# Patient Record
Sex: FEMALE | Race: WHITE | NOT HISPANIC OR LATINO | Employment: UNEMPLOYED | URBAN - NONMETROPOLITAN AREA
[De-identification: names, ages, dates, MRNs, and addresses within clinical notes are randomized per-mention and may not be internally consistent; named-entity substitution may affect disease eponyms.]

---

## 2023-02-02 ENCOUNTER — HOSPITAL ENCOUNTER (EMERGENCY)
Facility: HOSPITAL | Age: 33
Discharge: HOME | End: 2023-02-02
Attending: EMERGENCY MEDICINE

## 2023-02-02 ENCOUNTER — APPOINTMENT (EMERGENCY)
Dept: RADIOLOGY | Facility: HOSPITAL | Age: 33
End: 2023-02-02

## 2023-02-02 VITALS
HEART RATE: 104 BPM | WEIGHT: 163.36 LBS | OXYGEN SATURATION: 99 % | RESPIRATION RATE: 20 BRPM | DIASTOLIC BLOOD PRESSURE: 87 MMHG | SYSTOLIC BLOOD PRESSURE: 125 MMHG

## 2023-02-02 DIAGNOSIS — J18.9 PNEUMONITIS: ICD-10-CM

## 2023-02-02 DIAGNOSIS — Z77.098 EXPOSURE TO CHEMICAL INHALATION: Primary | ICD-10-CM

## 2023-02-02 RX ORDER — LEVOTHYROXINE SODIUM 0.05 MG/1
50 TABLET ORAL DAILY
COMMUNITY

## 2023-02-02 RX ORDER — SERTRALINE HYDROCHLORIDE 100 MG/1
100 TABLET, FILM COATED ORAL DAILY
COMMUNITY

## 2023-02-02 RX ORDER — ALBUTEROL SULFATE 90 UG/1
2 AEROSOL, METERED RESPIRATORY (INHALATION) ONCE
Status: COMPLETED | OUTPATIENT
Start: 2023-02-02 | End: 2023-02-02

## 2023-02-02 RX ORDER — ALBUTEROL SULFATE 2.5 MG/3ML
2.5 SOLUTION RESPIRATORY (INHALATION) ONCE
Status: COMPLETED | OUTPATIENT
Start: 2023-02-02 | End: 2023-02-02

## 2023-02-02 RX ORDER — LORAZEPAM 0.5 MG/1
TABLET ORAL EVERY 8 HOURS PRN
COMMUNITY

## 2023-02-02 RX ORDER — TOPIRAMATE 25 MG/1
25 TABLET ORAL DAILY
COMMUNITY

## 2023-02-02 RX ORDER — DEXAMETHASONE 4 MG/1
8 TABLET ORAL ONCE
Status: COMPLETED | OUTPATIENT
Start: 2023-02-02 | End: 2023-02-02

## 2023-02-02 RX ORDER — LIDOCAINE HYDROCHLORIDE 20 MG/ML
5 INJECTION, SOLUTION EPIDURAL; INFILTRATION; INTRACAUDAL; PERINEURAL ONCE
Status: COMPLETED | OUTPATIENT
Start: 2023-02-02 | End: 2023-02-02

## 2023-02-02 RX ADMIN — ALBUTEROL SULFATE 2 PUFF: 90 AEROSOL, METERED RESPIRATORY (INHALATION) at 21:52

## 2023-02-02 RX ADMIN — DEXAMETHASONE 8 MG: 4 TABLET ORAL at 21:51

## 2023-02-02 RX ADMIN — LIDOCAINE HYDROCHLORIDE 5 ML: 20 INJECTION, SOLUTION EPIDURAL; INFILTRATION; INTRACAUDAL at 21:02

## 2023-02-02 RX ADMIN — ALBUTEROL SULFATE 2.5 MG: 2.5 SOLUTION RESPIRATORY (INHALATION) at 20:49

## 2023-02-03 NOTE — ED PROVIDER NOTES
History  Chief Complaint   Patient presents with   • Medical Problem     Bleach spill, Inhaled large amount bleach  After spill took shower, c/o pain with inspiration  Occurred yesterday  50-year-old female presents for evaluation of chest pressure  Patient states she had a bleach exposure yesterday which she believes is the source of her symptoms  She notes that she was attempting to put the bleach bottle on a table however the cath was on loose and she did not know, when she moved the bottle it hit a chair causing bleach to spray on her upper body  Patient states she got in her eyes however washed her eyes out in the shower, she denies pain or change in vision  Patient believes she inhaled bleach and feels burning sensation in her sinuses  Patient also reports a chest pressure sensation that started after this  She reports it is her whole left-sided chest   She has a sensation of dyspnea, she does endorse coughing episodes yesterday  She did have nausea yesterday however today this is remitted and she is tolerating p o  She denies cardiac history in herself, denies hormone use or previous VTE, lower extremity swelling or pain  Prior to Admission Medications   Prescriptions Last Dose Informant Patient Reported? Taking? LORazepam (ATIVAN) 0 5 mg tablet Past Week  Yes Yes   Sig: Take by mouth every 8 (eight) hours as needed for anxiety   levothyroxine 50 mcg tablet 2/1/2023  Yes Yes   Sig: Take 50 mcg by mouth daily   sertraline (ZOLOFT) 100 mg tablet 2/1/2023  Yes Yes   Sig: Take 100 mg by mouth daily   topiramate (TOPAMAX) 25 mg tablet 2/1/2023  Yes Yes   Sig: Take 25 mg by mouth daily      Facility-Administered Medications: None       Past Medical History:   Diagnosis Date   • Anxiety    • Disease of thyroid gland    • Endometriosis    • Migraine        No past surgical history on file  No family history on file    I have reviewed and agree with the history as documented  E-Cigarette/Vaping     E-Cigarette/Vaping Substances          Review of Systems   HENT: Positive for sinus pain  Eyes: Negative for pain and visual disturbance  Respiratory: Positive for chest tightness and shortness of breath  Cardiovascular: Negative for chest pain and leg swelling  Gastrointestinal: Positive for vomiting  Negative for nausea  All other systems reviewed and are negative  Physical Exam  Physical Exam  Vitals reviewed  Constitutional:       General: She is not in acute distress  Appearance: Normal appearance  She is not ill-appearing, toxic-appearing or diaphoretic  HENT:      Head: Normocephalic and atraumatic  Right Ear: External ear normal       Left Ear: External ear normal    Eyes:      General:         Right eye: No discharge  Left eye: No discharge  Extraocular Movements: Extraocular movements intact  Cardiovascular:      Rate and Rhythm: Normal rate and regular rhythm  Pulmonary:      Effort: Pulmonary effort is normal  No respiratory distress  Breath sounds: Normal breath sounds  No stridor  No wheezing, rhonchi or rales  Musculoskeletal:         General: No deformity or signs of injury  Right lower leg: No edema  Left lower leg: No edema  Skin:     General: Skin is warm  Coloration: Skin is not jaundiced or pale  Neurological:      General: No focal deficit present  Mental Status: She is alert  Mental status is at baseline        Gait: Gait normal          Vital Signs  ED Triage Vitals [02/02/23 2011]   Temp Pulse Respirations Blood Pressure SpO2   -- 104 20 125/87 100 %      Temp src Heart Rate Source Patient Position - Orthostatic VS BP Location FiO2 (%)   -- Monitor Sitting Right arm --      Pain Score       7           Vitals:    02/02/23 2011   BP: 125/87   Pulse: 104   Patient Position - Orthostatic VS: Sitting         Visual Acuity      ED Medications  Medications   dexamethasone (DECADRON) tablet 8 mg (has no administration in time range)   albuterol (PROVENTIL HFA,VENTOLIN HFA) inhaler 2 puff (has no administration in time range)   albuterol inhalation solution 2 5 mg (2 5 mg Nebulization Given 2/2/23 2049)   lidocaine (PF) (XYLOCAINE-MPF) 2 % injection 5 mL (5 mL Inhalation Given 2/2/23 2102)       Diagnostic Studies  Results Reviewed     None                 XR chest 1 view portable    (Results Pending)              Procedures  Procedures         ED Course  ED Course as of 02/02/23 2142   Thu Feb 02, 2023 2015 Blood Pressure: 125/87   2015 Pulse: 104   2015 Respirations: 20   2015 SpO2: 100 %   2054 Procedure Note: EKG  Date/Time: 02/02/23 8:54 PM   Interpreted by: Asia Owens  Indications / Diagnosis: CP  ECG reviewed by me, the ED Provider: yes   The EKG demonstrates:  Rhythm: normal sinus  Intervals: normal intervals  Axis: normal axis  QRS/Blocks: normal QRS  ST Changes: No acute ST Changes, no STD/JENNY        2134 XR chest 1 view portable  No ptx, no acute cardiopulmonary disease   2139 Feels improved after treatments, states lidocaine was weird feeling during but achieved pain control  Will give dose of decadron for pneumonitis as well as albuterol with spacer  Medical Decision Making  71-year-old female presents for evaluation after bleach exposure yesterday  Patient notes she cut bleach in her eyes for which she is presently asymptomatic, she reports continued sinus pain as well as a chest pressure sensation  She does have a sensation of dyspnea however is saturating well on room air at 100%, she is in no respiratory distress  Will attempt to treat symptoms of mild pneumonitis with albuterol as well as nebulized lidocaine  Will obtain a chest x-ray to rule out pneumothorax with coughing events, pneumonitis  Will obtain EKG with chest pressure sensation    Patient is low risk for ACS as well as PE, do not suspect these his etiologies  Exposure to chemical inhalation: acute illness or injury  Pneumonitis: acute illness or injury  Amount and/or Complexity of Data Reviewed  Radiology: ordered  Decision-making details documented in ED Course  ECG/medicine tests: ordered  Decision-making details documented in ED Course  Risk  Prescription drug management  Disposition  Final diagnoses:   Exposure to chemical inhalation   Pneumonitis     Time reflects when diagnosis was documented in both MDM as applicable and the Disposition within this note     Time User Action Codes Description Comment    2/2/2023  9:35 PM Shahnaz Cline Add [Z77 098] Exposure to chemical inhalation     2/2/2023  9:36 PM Shahnaz Cline Add [J18 9] Pneumonitis       ED Disposition     ED Disposition   Discharge    Condition   Stable    Date/Time   Thu Feb 2, 2023  9:35 PM    Comment   Alan Valle discharge to home/self care  Follow-up Information     Follow up With Specialties Details Why Contact Info    Caroline Ochoa MD Family Medicine   [address] [place] [id number] [phone]            Patient's Medications   Discharge Prescriptions    No medications on file       No discharge procedures on file      PDMP Review     None          ED Provider  Electronically Signed by           Ad May DO  02/02/23 7088

## 2023-02-04 LAB
ATRIAL RATE: 74 BPM
P AXIS: 31 DEGREES
PR INTERVAL: 122 MS
QRS AXIS: 58 DEGREES
QRSD INTERVAL: 84 MS
QT INTERVAL: 380 MS
QTC INTERVAL: 421 MS
T WAVE AXIS: 28 DEGREES
VENTRICULAR RATE: 74 BPM

## 2023-12-22 ENCOUNTER — HOSPITAL ENCOUNTER (EMERGENCY)
Facility: HOSPITAL | Age: 33
Discharge: HOME | End: 2023-12-23
Attending: EMERGENCY MEDICINE | Admitting: EMERGENCY MEDICINE
Payer: COMMERCIAL

## 2023-12-22 DIAGNOSIS — O20.0 THREATENED MISCARRIAGE IN EARLY PREGNANCY: Primary | ICD-10-CM

## 2023-12-22 LAB
BASOPHILS # BLD AUTO: 0.06 THOUSANDS/ÂΜL (ref 0–0.1)
BASOPHILS NFR BLD AUTO: 1 % (ref 0–1)
EOSINOPHIL # BLD AUTO: 0.56 THOUSAND/ÂΜL (ref 0–0.61)
EOSINOPHIL NFR BLD AUTO: 6 % (ref 0–6)
ERYTHROCYTE [DISTWIDTH] IN BLOOD BY AUTOMATED COUNT: 16.6 % (ref 11.6–15.1)
HCT VFR BLD AUTO: 38.3 % (ref 34.8–46.1)
HGB BLD-MCNC: 11.7 G/DL (ref 11.5–15.4)
IMM GRANULOCYTES # BLD AUTO: 0.03 THOUSAND/UL (ref 0–0.2)
IMM GRANULOCYTES NFR BLD AUTO: 0 % (ref 0–2)
LYMPHOCYTES # BLD AUTO: 1.88 THOUSANDS/ÂΜL (ref 0.6–4.47)
LYMPHOCYTES NFR BLD AUTO: 21 % (ref 14–44)
MCH RBC QN AUTO: 24 PG (ref 26.8–34.3)
MCHC RBC AUTO-ENTMCNC: 30.5 G/DL (ref 31.4–37.4)
MCV RBC AUTO: 79 FL (ref 82–98)
MONOCYTES # BLD AUTO: 0.65 THOUSAND/ÂΜL (ref 0.17–1.22)
MONOCYTES NFR BLD AUTO: 7 % (ref 4–12)
NEUTROPHILS # BLD AUTO: 5.63 THOUSANDS/ÂΜL (ref 1.85–7.62)
NEUTS SEG NFR BLD AUTO: 65 % (ref 43–75)
NRBC BLD AUTO-RTO: 0 /100 WBCS
PLATELET # BLD AUTO: 284 THOUSANDS/UL (ref 149–390)
PMV BLD AUTO: 11.6 FL (ref 8.9–12.7)
RBC # BLD AUTO: 4.88 MILLION/UL (ref 3.81–5.12)
WBC # BLD AUTO: 8.81 THOUSAND/UL (ref 4.31–10.16)

## 2023-12-22 PROCEDURE — 80048 BASIC METABOLIC PNL TOTAL CA: CPT | Performed by: EMERGENCY MEDICINE

## 2023-12-22 PROCEDURE — 99284 EMERGENCY DEPT VISIT MOD MDM: CPT | Performed by: EMERGENCY MEDICINE

## 2023-12-22 PROCEDURE — 99283 EMERGENCY DEPT VISIT LOW MDM: CPT

## 2023-12-22 PROCEDURE — 85025 COMPLETE CBC W/AUTO DIFF WBC: CPT | Performed by: EMERGENCY MEDICINE

## 2023-12-22 PROCEDURE — 76815 OB US LIMITED FETUS(S): CPT | Performed by: EMERGENCY MEDICINE

## 2023-12-22 PROCEDURE — 36415 COLL VENOUS BLD VENIPUNCTURE: CPT | Performed by: EMERGENCY MEDICINE

## 2023-12-22 PROCEDURE — 96360 HYDRATION IV INFUSION INIT: CPT

## 2023-12-22 PROCEDURE — 0241U HB NFCT DS VIR RESP RNA 4 TRGT: CPT | Performed by: EMERGENCY MEDICINE

## 2023-12-22 RX ORDER — ACETAMINOPHEN 325 MG/1
975 TABLET ORAL ONCE
Status: COMPLETED | OUTPATIENT
Start: 2023-12-22 | End: 2023-12-22

## 2023-12-22 RX ADMIN — ACETAMINOPHEN 975 MG: 325 TABLET, FILM COATED ORAL at 23:35

## 2023-12-22 RX ADMIN — SODIUM CHLORIDE 1000 ML: 0.9 INJECTION, SOLUTION INTRAVENOUS at 23:43

## 2023-12-23 VITALS
TEMPERATURE: 97.2 F | HEART RATE: 77 BPM | DIASTOLIC BLOOD PRESSURE: 66 MMHG | RESPIRATION RATE: 20 BRPM | OXYGEN SATURATION: 99 % | SYSTOLIC BLOOD PRESSURE: 107 MMHG

## 2023-12-23 LAB
ANION GAP SERPL CALCULATED.3IONS-SCNC: 11 MMOL/L
BACTERIA UR QL AUTO: NORMAL /HPF
BILIRUB UR QL STRIP: NEGATIVE
BUN SERPL-MCNC: 7 MG/DL (ref 5–25)
CALCIUM SERPL-MCNC: 9.1 MG/DL (ref 8.4–10.2)
CHLORIDE SERPL-SCNC: 101 MMOL/L (ref 96–108)
CLARITY UR: CLEAR
CO2 SERPL-SCNC: 22 MMOL/L (ref 21–32)
COLOR UR: YELLOW
CREAT SERPL-MCNC: 0.51 MG/DL (ref 0.6–1.3)
FLUAV RNA RESP QL NAA+PROBE: NEGATIVE
FLUBV RNA RESP QL NAA+PROBE: NEGATIVE
GFR SERPL CREATININE-BSD FRML MDRD: 127 ML/MIN/1.73SQ M
GLUCOSE SERPL-MCNC: 79 MG/DL (ref 65–140)
GLUCOSE UR STRIP-MCNC: NEGATIVE MG/DL
HGB UR QL STRIP.AUTO: ABNORMAL
KETONES UR STRIP-MCNC: ABNORMAL MG/DL
LEUKOCYTE ESTERASE UR QL STRIP: ABNORMAL
NITRITE UR QL STRIP: NEGATIVE
NON-SQ EPI CELLS URNS QL MICRO: NORMAL /HPF
PH UR STRIP.AUTO: 6 [PH]
POTASSIUM SERPL-SCNC: 3.7 MMOL/L (ref 3.5–5.3)
PROT UR STRIP-MCNC: NEGATIVE MG/DL
RBC #/AREA URNS AUTO: NORMAL /HPF
RSV RNA RESP QL NAA+PROBE: NEGATIVE
SARS-COV-2 RNA RESP QL NAA+PROBE: NEGATIVE
SODIUM SERPL-SCNC: 134 MMOL/L (ref 135–147)
SP GR UR STRIP.AUTO: <=1.005 (ref 1–1.03)
UROBILINOGEN UR QL STRIP.AUTO: 0.2 E.U./DL
WBC #/AREA URNS AUTO: NORMAL /HPF

## 2023-12-23 PROCEDURE — 87086 URINE CULTURE/COLONY COUNT: CPT | Performed by: EMERGENCY MEDICINE

## 2023-12-23 PROCEDURE — 81001 URINALYSIS AUTO W/SCOPE: CPT | Performed by: EMERGENCY MEDICINE

## 2023-12-23 NOTE — DISCHARGE INSTRUCTIONS
You have been seen for vaginal spotting and abdominal pain during pregnancy. Please take tylenol as needed for discomfort. Return to the emergency department if you develop worsening bleeding, abdominal pain, vomiting, fevers or any other symptoms of concern. Please follow up with your OB/GYN by calling the number provided.

## 2023-12-23 NOTE — ED PROVIDER NOTES
"History  Chief Complaint   Patient presents with    Vaginal Bleeding     Per pt, is 9 weeks pregnant, following the [facility] OB, no issues thus far, began w/ vomiting/chills 24 hours ago, then began w/ bloody tinged vaginal discharge and cramping.     Carolina Daomn is a 33 y.o. year old female @ 8w1d per prior US presenting to the [facility] ED for vaginal bleeding. Patient had several episodes of vomiting throughout the evening last night which resolved this afternoon. No associated diarrhea. Patient felt fatigued and body aches. No associated fevers. Patient feeling lower abdominal cramping starting this evening. Worsened one hour ago with left-sided, lower abdominal discomfort described as a 4/10 \"pinching\" sensation. Patient noticed red-tinged/brown discharge on toilet paper this evening as well. She is not passing clots of blood. No reported dysuria. Patient has not taken/received any medications at home for relief of symptoms.       History provided by:  Patient and medical records   used: No        Prior to Admission Medications   Prescriptions Last Dose Informant Patient Reported? Taking?   LORazepam (ATIVAN) 0.5 mg tablet Not Taking  Yes No   Sig: Take by mouth every 8 (eight) hours as needed for anxiety   Patient not taking: Reported on 12/22/2023   Prenatal MV-Min-Fe Fum-FA-DHA (PRENATAL 1 PO)   Yes No   Sig: Take by mouth   levothyroxine 50 mcg tablet   Yes No   Sig: Take 50 mcg by mouth daily   sertraline (ZOLOFT) 100 mg tablet   Yes No   Sig: Take 150 mg by mouth daily   topiramate (TOPAMAX) 25 mg tablet Not Taking  Yes No   Sig: Take 25 mg by mouth daily   Patient not taking: Reported on 12/22/2023      Facility-Administered Medications: None       Past Medical History:   Diagnosis Date    Anxiety     Disease of thyroid gland     Endometriosis     Migraine        History reviewed. No pertinent surgical history.    History reviewed. No pertinent family history.  I have reviewed and agree " with the history as documented.    E-Cigarette/Vaping     E-Cigarette/Vaping Substances     Social History     Tobacco Use    Smoking status: Never    Smokeless tobacco: Never   Substance Use Topics    Alcohol use: Not Currently    Drug use: Not Currently       Review of Systems   Constitutional:  Negative for chills and fever.   Respiratory:  Negative for shortness of breath.    Cardiovascular:  Negative for chest pain.   Gastrointestinal:  Positive for abdominal pain, nausea and vomiting. Negative for diarrhea.   Genitourinary:  Positive for vaginal bleeding. Negative for dysuria, flank pain and vaginal discharge.   Musculoskeletal:  Negative for back pain.   All other systems reviewed and are negative.      Physical Exam  Physical Exam  Vitals and nursing note reviewed.   Constitutional:       General: She is not in acute distress.     Appearance: Normal appearance. She is well-developed. She is not ill-appearing, toxic-appearing or diaphoretic.   HENT:      Head: Normocephalic and atraumatic.      Nose: No congestion or rhinorrhea.   Eyes:      General:         Right eye: No discharge.         Left eye: No discharge.   Cardiovascular:      Rate and Rhythm: Normal rate and regular rhythm.   Pulmonary:      Effort: Pulmonary effort is normal. No accessory muscle usage or respiratory distress.      Breath sounds: Normal breath sounds. No stridor. No decreased breath sounds, wheezing, rhonchi or rales.   Abdominal:      General: There is distension (abdomen).      Palpations: Abdomen is soft.      Tenderness: There is abdominal tenderness in the suprapubic area and left lower quadrant. There is no right CVA tenderness, left CVA tenderness, guarding or rebound.   Musculoskeletal:      Right lower leg: No tenderness.      Left lower leg: No tenderness.   Skin:     Capillary Refill: Capillary refill takes less than 2 seconds.      Findings: No rash.   Neurological:      Mental Status: She is alert and oriented to  person, place, and time.   Psychiatric:         Mood and Affect: Mood normal.         Behavior: Behavior normal.         Vital Signs  ED Triage Vitals [12/22/23 2253]   Temperature Pulse Respirations Blood Pressure SpO2   (!) 97.2 °F (36.2 °C) 76 18 124/82 99 %      Temp Source Heart Rate Source Patient Position - Orthostatic VS BP Location FiO2 (%)   Temporal Monitor Lying Right arm --      Pain Score       4           Vitals:    12/22/23 2253 12/23/23 0031 12/23/23 0104   BP: 124/82 113/57 107/66   Pulse: 76 89 77   Patient Position - Orthostatic VS: Lying           Visual Acuity      ED Medications  Medications   sodium chloride 0.9 % bolus 1,000 mL (0 mL Intravenous Stopped 12/23/23 0053)   acetaminophen (TYLENOL) tablet 975 mg (975 mg Oral Given 12/22/23 2335)       Diagnostic Studies  Results Reviewed       Procedure Component Value Units Date/Time    Urine Microscopic [[id number]] Collected: 12/23/23 0012    Lab Status: Final result Specimen: Urine, Clean Catch Updated: 12/23/23 0052     RBC, UA 1-2 /hpf      WBC, UA 1-2 /hpf      Epithelial Cells Occasional /hpf      Bacteria, UA Occasional /hpf      URINE COMMENT --    UA w Reflex to Microscopic w Reflex to Culture [[id number]]  (Abnormal) Collected: 12/23/23 0012    Lab Status: Final result Specimen: Urine, Clean Catch Updated: 12/23/23 0038     Color, UA Yellow     Clarity, UA Clear     Specific Gravity, UA <=1.005     pH, UA 6.0     Leukocytes, UA Small     Nitrite, UA Negative     Protein, UA Negative mg/dl      Glucose, UA Negative mg/dl      Ketones, UA 15 (1+) mg/dl      Urobilinogen, UA 0.2 E.U./dl      Bilirubin, UA Negative     Occult Blood, UA Moderate     URINE COMMENT --    Urine culture [[id number]] Collected: 12/23/23 0012    Lab Status: In process Specimen: Urine, Clean Catch Updated: 12/23/23 0038    FLU/RSV/COVID - if FLU/RSV clinically relevant [[id number]]  (Normal) Collected: 12/22/23 2342    Lab Status: Final result Specimen: Nares from  Nose Updated: 12/23/23 0024     SARS-CoV-2 Negative     INFLUENZA A PCR Negative     INFLUENZA B PCR Negative     RSV PCR Negative    Narrative:      FOR PEDIATRIC PATIENTS - copy/paste COVID Guidelines URL to browser: [email or web address]    SARS-CoV-2 assay is a Nucleic Acid Amplification assay intended for the  qualitative detection of nucleic acid from SARS-CoV-2 in nasopharyngeal  swabs. Results are for the presumptive identification of SARS-CoV-2 RNA.    Positive results are indicative of infection with SARS-CoV-2, the virus  causing COVID-19, but do not rule out bacterial infection or co-infection  with other viruses. Laboratories within the [place] and its  territories are required to report all positive results to the appropriate  public health authorities. Negative results do not preclude SARS-CoV-2  infection and should not be used as the sole basis for treatment or other  patient management decisions. Negative results must be combined with  clinical observations, patient history, and epidemiological information.  This test has not been FDA cleared or approved.    This test has been authorized by FDA under an Emergency Use Authorization  (EUA). This test is only authorized for the duration of time the  declaration that circumstances exist justifying the authorization of the  emergency use of an in vitro diagnostic tests for detection of SARS-CoV-2  virus and/or diagnosis of COVID-19 infection under section 564(b)(1) of  the Act, 21 U.S.C. 360bbb-3(b)(1), unless the authorization is terminated  or revoked sooner. The test has been validated but independent review by FDA  and CLIA is pending.    Test performed using [employer]: This RT-PCR assay targets N2,  a region unique to SARS-CoV-2. A conserved region in the E-gene was chosen  for pan-Sarbecovirus detection which includes SARS-CoV-2.    According to CMS-2020-01-R, this platform meets  the definition of high-throughput technology.    Basic metabolic panel [[id number]]  (Abnormal) Collected: 12/22/23 2342    Lab Status: Final result Specimen: Blood from Arm, Right Updated: 12/23/23 0003     Sodium 134 mmol/L      Potassium 3.7 mmol/L      Chloride 101 mmol/L      CO2 22 mmol/L      ANION GAP 11 mmol/L      BUN 7 mg/dL      Creatinine 0.51 mg/dL      Glucose 79 mg/dL      Calcium 9.1 mg/dL      eGFR 127 ml/min/1.73sq m     Narrative:      National Kidney Disease Foundation guidelines for Chronic Kidney Disease (CKD):     Stage 1 with normal or high GFR (GFR > 90 mL/min/1.73 square meters)    Stage 2 Mild CKD (GFR = 60-89 mL/min/1.73 square meters)    Stage 3A Moderate CKD (GFR = 45-59 mL/min/1.73 square meters)    Stage 3B Moderate CKD (GFR = 30-44 mL/min/1.73 square meters)    Stage 4 Severe CKD (GFR = 15-29 mL/min/1.73 square meters)    Stage 5 End Stage CKD (GFR <15 mL/min/1.73 square meters)  Note: GFR calculation is accurate only with a steady state creatinine    CBC and differential [[id number]]  (Abnormal) Collected: 12/22/23 2342    Lab Status: Final result Specimen: Blood from Arm, Right Updated: 12/22/23 2347     WBC 8.81 Thousand/uL      RBC 4.88 Million/uL      Hemoglobin 11.7 g/dL      Hematocrit 38.3 %      MCV 79 fL      MCH 24.0 pg      MCHC 30.5 g/dL      RDW 16.6 %      MPV 11.6 fL      Platelets 284 Thousands/uL      nRBC 0 /100 WBCs      Neutrophils Relative 65 %      Immat GRANS % 0 %      Lymphocytes Relative 21 %      Monocytes Relative 7 %      Eosinophils Relative 6 %      Basophils Relative 1 %      Neutrophils Absolute 5.63 Thousands/µL      Immature Grans Absolute 0.03 Thousand/uL      Lymphocytes Absolute 1.88 Thousands/µL      Monocytes Absolute 0.65 Thousand/µL      Eosinophils Absolute 0.56 Thousand/µL      Basophils Absolute 0.06 Thousands/µL                    No orders to display              Procedures  POC Pelvic US    Date/Time: 12/22/2023 11:10 PM    Performed  by: Modesto Boo DO  Authorized by: Modesto Boo DO    Patient location:  ED  Procedure details:     Exam Type:  Diagnostic    Indications: pregnant with abdominal pain and pregnant with vaginal bleeding      Assessment for: evaluate fetal viability    Uterine findings:     Endometrial stripe: identified      Intrauterine pregnancy: identified      Gestational sac: identified      Yolk sac: identified      Fetal pole: identified      Fetal heart rate: identified      Fetal heart rate (bpm):  170  Other findings:     Free peritoneal fluid: not identified    Interpretation:     Ultrasound impressions: normal      Pregnancy findings: intrauterine pregnancy (IUP)             ED Course  ED Course as of 12/24/23 0054   Fri Dec 22, 2023   2351 WBC: 8.81   2351 Hemoglobin: 11.7   Sat Dec 23, 2023   0105 Patient well-appearing. VSS. Reviewed labs. Discussed potential for impending miscarriage or potential for viable pregnancy.                                SBIRT 20yo+      Flowsheet Row Most Recent Value   Initial Alcohol Screen: US AUDIT-C     1. How often do you have a drink containing alcohol? 0 Filed at: 12/22/2023 2257   2. How many drinks containing alcohol do you have on a typical day you are drinking?  0 Filed at: 12/22/2023 2257   3b. FEMALE Any Age, or MALE 65+: How often do you have 4 or more drinks on one occassion? 0 Filed at: 12/22/2023 2257   Audit-C Score 0 Filed at: 12/22/2023 2257   SWATI: How many times in the past year have you...    Used an illegal drug or used a prescription medication for non-medical reasons? Never Filed at: 12/22/2023 2257                      Medical Decision Making    33 y.o. female presenting for abdominal cramping and vaginal spotting.  VSS, nontoxic appearing.  Chart reviewed. Prior TVUS confirmed IUP. Do not suspect ectopic pregnancy.  No RLQ tenderness, do not suspect appendicitis.  No flank pain, do not suspect kidney stone.  Will check labs to screen for anemia,  electrolyte abnormality or ARNOLDO.  Will screen for UTI however patient denying dysuria.  Given flu-like symptoms will check viral PCR testing.    POCUS performed demonstrating IUP with +FHT.    Reassessment: patient well-appearing on repeat evaluation, nontoxic appearing with stable vitals. Reviewed lab results.  No dysuria, will defer abx and await urine culture.  Discussed possible etiology of vaginal bleeding including potential for impending miscarriage and also discussed benign causes of bleeding in first trimester.    Disposition: I have discussed with the patient our plan to discharge them from the ED and the patient is in agreement with this plan.     Discharge Plan: PRN tylenol for pain, supportive care. RTED precautions emphasized. The patient was provided a written after visit summary with strict RTED precautions.     Followup: I have discussed with the patient plan to follow up with OB/GYN. Contact information provided in AVS.    Amount and/or Complexity of Data Reviewed  Labs: ordered. Decision-making details documented in ED Course.    Risk  OTC drugs.             Disposition  Final diagnoses:   Threatened miscarriage in early pregnancy     Time reflects when diagnosis was documented in both MDM as applicable and the Disposition within this note       Time User Action Codes Description Comment    12/23/2023  1:07 AM Modesto Boo Add [O20.0] Threatened miscarriage in early pregnancy           ED Disposition       ED Disposition   Discharge    Condition   Stable    Date/Time   Sat Dec 23, 2023 0107    Comment   Carolina Damon discharge to home/self care.                   Follow-up Information       Follow up With Specialties Details Why Contact Info    Js Oconnell MD  Schedule an appointment as soon as possible for a visit  To make appointment for reevaluation in 3-5 days. [address] [facility] [address]  [phone]              Discharge Medication List as of 12/23/2023   1:08 AM        CONTINUE these medications which have NOT CHANGED    Details   levothyroxine 50 mcg tablet Take 50 mcg by mouth daily, Historical Med      LORazepam (ATIVAN) 0.5 mg tablet Take by mouth every 8 (eight) hours as needed for anxiety, Historical Med      Prenatal MV-Min-Fe Fum-FA-DHA (PRENATAL 1 PO) Take by mouth, Historical Med      sertraline (ZOLOFT) 100 mg tablet Take 150 mg by mouth daily, Historical Med      topiramate (TOPAMAX) 25 mg tablet Take 25 mg by mouth daily, Historical Med             No discharge procedures on file.    PDMP Review       None            ED Provider  Electronically Signed by             Modesto Boo,   12/24/23 6184

## 2023-12-24 LAB — BACTERIA UR CULT: NORMAL
